# Patient Record
Sex: FEMALE | Race: WHITE | Employment: FULL TIME | URBAN - METROPOLITAN AREA
[De-identification: names, ages, dates, MRNs, and addresses within clinical notes are randomized per-mention and may not be internally consistent; named-entity substitution may affect disease eponyms.]

---

## 2017-03-25 ENCOUNTER — HOSPITAL ENCOUNTER (OUTPATIENT)
Dept: ULTRASOUND IMAGING | Age: 42
Discharge: HOME | End: 2017-03-25
Payer: MEDICAID

## 2017-03-25 DIAGNOSIS — Z34.91 ENCOUNTER FOR SUPERVISION OF NORMAL PREGNANCY IN FIRST TRIMESTER, UNSPECIFIED GRAVIDITY: ICD-10-CM

## 2017-03-25 DIAGNOSIS — Z34.80 SUPERVISION OF OTHER NORMAL PREGNANCY, ANTEPARTUM: ICD-10-CM

## 2017-03-25 PROCEDURE — 76805 OB US >/= 14 WKS SNGL FETUS: CPT

## 2023-05-30 DIAGNOSIS — G47.00 INSOMNIA, UNSPECIFIED TYPE: ICD-10-CM

## 2023-05-30 RX ORDER — ZOLPIDEM TARTRATE 5 MG/1
1 TABLET ORAL NIGHTLY PRN
COMMUNITY
Start: 2020-09-14 | End: 2023-05-30 | Stop reason: SDUPTHER

## 2023-05-31 RX ORDER — ZOLPIDEM TARTRATE 5 MG/1
5 TABLET ORAL NIGHTLY PRN
Qty: 30 TABLET | Refills: 0 | Status: SHIPPED | OUTPATIENT
Start: 2023-05-31 | End: 2023-06-12 | Stop reason: SDUPTHER

## 2023-06-12 ENCOUNTER — OFFICE VISIT (OUTPATIENT)
Dept: PRIMARY CARE | Facility: CLINIC | Age: 48
End: 2023-06-12
Payer: COMMERCIAL

## 2023-06-12 VITALS
HEART RATE: 88 BPM | HEIGHT: 63 IN | BODY MASS INDEX: 41.82 KG/M2 | RESPIRATION RATE: 16 BRPM | DIASTOLIC BLOOD PRESSURE: 82 MMHG | TEMPERATURE: 98.1 F | WEIGHT: 236 LBS | SYSTOLIC BLOOD PRESSURE: 130 MMHG

## 2023-06-12 DIAGNOSIS — Z12.12 ENCOUNTER FOR COLORECTAL CANCER SCREENING: ICD-10-CM

## 2023-06-12 DIAGNOSIS — F51.01 PRIMARY INSOMNIA: Primary | ICD-10-CM

## 2023-06-12 DIAGNOSIS — Z12.11 ENCOUNTER FOR COLORECTAL CANCER SCREENING: ICD-10-CM

## 2023-06-12 DIAGNOSIS — G89.29 CHRONIC PAIN OF RIGHT KNEE: ICD-10-CM

## 2023-06-12 DIAGNOSIS — Z12.31 ENCOUNTER FOR SCREENING MAMMOGRAM FOR MALIGNANT NEOPLASM OF BREAST: ICD-10-CM

## 2023-06-12 DIAGNOSIS — M25.561 CHRONIC PAIN OF RIGHT KNEE: ICD-10-CM

## 2023-06-12 DIAGNOSIS — Z00.00 ROUTINE GENERAL MEDICAL EXAMINATION AT A HEALTH CARE FACILITY: ICD-10-CM

## 2023-06-12 DIAGNOSIS — G47.00 INSOMNIA, UNSPECIFIED TYPE: ICD-10-CM

## 2023-06-12 PROBLEM — M17.11 PRIMARY OSTEOARTHRITIS OF RIGHT KNEE: Status: RESOLVED | Noted: 2023-06-12 | Resolved: 2023-06-12

## 2023-06-12 PROBLEM — M17.11 PRIMARY OSTEOARTHRITIS OF RIGHT KNEE: Status: ACTIVE | Noted: 2023-06-12

## 2023-06-12 PROCEDURE — 1036F TOBACCO NON-USER: CPT | Performed by: FAMILY MEDICINE

## 2023-06-12 PROCEDURE — 99214 OFFICE O/P EST MOD 30 MIN: CPT | Performed by: FAMILY MEDICINE

## 2023-06-12 RX ORDER — MELOXICAM 15 MG/1
15 TABLET ORAL DAILY
Qty: 30 TABLET | Refills: 11 | Status: SHIPPED | OUTPATIENT
Start: 2023-06-12 | End: 2024-06-11

## 2023-06-12 RX ORDER — IBUPROFEN 200 MG
1 TABLET ORAL 3 TIMES DAILY PRN
COMMUNITY
Start: 2020-09-14

## 2023-06-12 RX ORDER — ZOLPIDEM TARTRATE 5 MG/1
5 TABLET ORAL NIGHTLY PRN
Qty: 30 TABLET | Refills: 0 | Status: SHIPPED | OUTPATIENT
Start: 2023-06-12 | End: 2023-07-25 | Stop reason: SDUPTHER

## 2023-06-12 ASSESSMENT — ENCOUNTER SYMPTOMS: INSOMNIA: 1

## 2023-06-12 NOTE — PROGRESS NOTES
Subjective   Harpal Mandujano is a 48 y.o. female who presents for Insomnia.  Insomnia  This is a chronic problem. The problem occurs intermittently. Progression since onset: stable. Treatments tried: Ambien. The treatment provided significant relief.     Visit Vitals  /82   Pulse 88   Temp 36.7 °C (98.1 °F)   Resp 16      Objective   Physical Exam  Constitutional:       Appearance: Normal appearance.   HENT:      Head: Normocephalic.   Eyes:      Conjunctiva/sclera: Conjunctivae normal.   Cardiovascular:      Rate and Rhythm: Normal rate and regular rhythm.   Pulmonary:      Effort: Pulmonary effort is normal.      Breath sounds: Normal breath sounds.   Musculoskeletal:      Cervical back: Neck supple.      Comments: Right knee: There is no effusion, the patella is mobile.  MCL and LCL are intact.  Anterior and posterior cruciate ligaments are intact.  There is significant pain and crepitus on Magdalena's test both medially and laterally.  There is significant crepitus on knee flexion and pain.   Skin:     General: Skin is warm and dry.   Neurological:      Mental Status: She is alert.         Assessment/Plan    Problem List Items Addressed This Visit       Primary insomnia - Primary     She works nights and swings her sleep schedule back-and-forth twice a week.  Ambien helps her to sleep when she needs to.  We signed a controlled substance agreement and reviewed her OARRS report today.  I will continue to refill her Ambien         Relevant Medications    zolpidem (Ambien) 5 mg tablet    Chronic pain of right knee     Examination today is consistent with meniscus injury but there also may be a component of osteoarthritis.  We will start her on meloxicam 50 mg daily as this is causing significant discomfort at work where she is required to be on her feet during the day.  I will also refer her to orthopedic surgery as she may be a good candidate for an injection or arthroscopic procedure.         Relevant  Medications    meloxicam (Mobic) 15 mg tablet    Other Relevant Orders    Referral to Orthopaedic Surgery     Other Visit Diagnoses       Encounter for screening mammogram for malignant neoplasm of breast        Relevant Orders    BI mammo bilateral screening tomosynthesis    Encounter for colorectal cancer screening        Relevant Orders    Colonoscopy    Insomnia, unspecified type        Relevant Medications    zolpidem (Ambien) 5 mg tablet    Routine general medical examination at a health care facility        Relevant Orders    CBC    Comprehensive Metabolic Panel    Lipid Panel    Referral to Obstetrics / Gynecology    Hepatitis C Antibody        It has been 2 years since she has been in for a visit so we will go ahead and catch her up on her screening blood work, mammogram, colonoscopy.  I will also refer her for screening Pap test as it has been about 5 years

## 2023-06-12 NOTE — ASSESSMENT & PLAN NOTE
Examination today is consistent with meniscus injury but there also may be a component of osteoarthritis.  We will start her on meloxicam 50 mg daily as this is causing significant discomfort at work where she is required to be on her feet during the day.  I will also refer her to orthopedic surgery as she may be a good candidate for an injection or arthroscopic procedure.

## 2023-06-12 NOTE — ASSESSMENT & PLAN NOTE
She works nights and swings her sleep schedule back-and-forth twice a week.  Ambien helps her to sleep when she needs to.  We signed a controlled substance agreement and reviewed her OARRS report today.  I will continue to refill her Ambien

## 2023-07-25 DIAGNOSIS — G47.00 INSOMNIA, UNSPECIFIED TYPE: ICD-10-CM

## 2023-07-25 RX ORDER — ZOLPIDEM TARTRATE 5 MG/1
5 TABLET ORAL NIGHTLY PRN
Qty: 90 TABLET | Refills: 0 | Status: SHIPPED | OUTPATIENT
Start: 2023-07-25 | End: 2023-10-26 | Stop reason: SDUPTHER

## 2023-10-26 DIAGNOSIS — G47.00 INSOMNIA, UNSPECIFIED TYPE: ICD-10-CM

## 2023-10-26 RX ORDER — ZOLPIDEM TARTRATE 5 MG/1
5 TABLET ORAL NIGHTLY PRN
Qty: 90 TABLET | Refills: 0 | Status: SHIPPED | OUTPATIENT
Start: 2023-10-26 | End: 2024-01-18 | Stop reason: SDUPTHER

## 2024-01-18 DIAGNOSIS — G47.00 INSOMNIA, UNSPECIFIED TYPE: ICD-10-CM

## 2024-01-18 RX ORDER — ZOLPIDEM TARTRATE 5 MG/1
5 TABLET ORAL NIGHTLY PRN
Qty: 90 TABLET | Refills: 0 | Status: SHIPPED | OUTPATIENT
Start: 2024-01-18 | End: 2024-04-16 | Stop reason: SDUPTHER

## 2024-04-16 DIAGNOSIS — G47.00 INSOMNIA, UNSPECIFIED TYPE: ICD-10-CM

## 2024-04-16 RX ORDER — ZOLPIDEM TARTRATE 5 MG/1
5 TABLET ORAL NIGHTLY PRN
Qty: 90 TABLET | Refills: 0 | Status: SHIPPED | OUTPATIENT
Start: 2024-04-16

## 2024-07-05 DIAGNOSIS — G47.00 INSOMNIA, UNSPECIFIED TYPE: ICD-10-CM

## 2024-07-05 RX ORDER — ZOLPIDEM TARTRATE 5 MG/1
5 TABLET ORAL NIGHTLY PRN
Qty: 90 TABLET | Refills: 0 | OUTPATIENT
Start: 2024-07-05

## 2024-07-05 NOTE — TELEPHONE ENCOUNTER
Harpal  needs an appointment before any meds an be refilled. If she  needs a short supply while waiting to get in, let me know and we can do that.

## 2024-07-08 NOTE — TELEPHONE ENCOUNTER
Patient is scheduled for August 5th, 2024 @ 10:20 per her request.   She has many appointments due to taking care of her elderly father right now.   Would you be able to send in a month supply until the patient comes in 8-5-24, or do you need to see her sooner?     Please advise, Thanks!

## 2024-08-05 ENCOUNTER — APPOINTMENT (OUTPATIENT)
Dept: PRIMARY CARE | Facility: CLINIC | Age: 49
End: 2024-08-05
Payer: COMMERCIAL

## 2024-08-05 VITALS
HEART RATE: 98 BPM | DIASTOLIC BLOOD PRESSURE: 82 MMHG | HEIGHT: 63 IN | SYSTOLIC BLOOD PRESSURE: 136 MMHG | RESPIRATION RATE: 18 BRPM | TEMPERATURE: 97.8 F | WEIGHT: 234 LBS | BODY MASS INDEX: 41.46 KG/M2

## 2024-08-05 DIAGNOSIS — Z11.59 ENCOUNTER FOR HEPATITIS C SCREENING TEST FOR LOW RISK PATIENT: ICD-10-CM

## 2024-08-05 DIAGNOSIS — Z00.00 ROUTINE GENERAL MEDICAL EXAMINATION AT A HEALTH CARE FACILITY: Primary | ICD-10-CM

## 2024-08-05 DIAGNOSIS — Z12.11 ENCOUNTER FOR SCREENING FOR MALIGNANT NEOPLASM OF COLON: ICD-10-CM

## 2024-08-05 DIAGNOSIS — Z12.31 ENCOUNTER FOR SCREENING MAMMOGRAM FOR MALIGNANT NEOPLASM OF BREAST: ICD-10-CM

## 2024-08-05 DIAGNOSIS — G47.00 INSOMNIA, UNSPECIFIED TYPE: ICD-10-CM

## 2024-08-05 DIAGNOSIS — F51.01 PRIMARY INSOMNIA: ICD-10-CM

## 2024-08-05 PROCEDURE — 1036F TOBACCO NON-USER: CPT | Performed by: FAMILY MEDICINE

## 2024-08-05 PROCEDURE — 99396 PREV VISIT EST AGE 40-64: CPT | Performed by: FAMILY MEDICINE

## 2024-08-05 PROCEDURE — 3008F BODY MASS INDEX DOCD: CPT | Performed by: FAMILY MEDICINE

## 2024-08-05 RX ORDER — ZOLPIDEM TARTRATE 5 MG/1
5 TABLET ORAL NIGHTLY PRN
Qty: 30 TABLET | Refills: 5 | Status: SHIPPED | OUTPATIENT
Start: 2024-08-05

## 2024-08-05 ASSESSMENT — ENCOUNTER SYMPTOMS: INSOMNIA: 1

## 2024-08-05 NOTE — PROGRESS NOTES
Subjective   Harpal Mandujano is a 49 y.o. female who presents for Insomnia.     Insomnia  This is a chronic problem. The problem has been unchanged. Treatments tried: Ambien. The treatment provided significant relief.            Visit Vitals  /82   Pulse 98   Temp 36.6 °C (97.8 °F)   Resp 18      Objective   Physical Exam  Constitutional:       Appearance: Normal appearance.   HENT:      Head: Normocephalic.   Eyes:      Conjunctiva/sclera: Conjunctivae normal.   Cardiovascular:      Rate and Rhythm: Normal rate and regular rhythm.      Heart sounds: Normal heart sounds.   Pulmonary:      Effort: Pulmonary effort is normal.      Breath sounds: Normal breath sounds.   Musculoskeletal:      Cervical back: Neck supple.   Skin:     General: Skin is warm and dry.   Neurological:      Mental Status: She is alert.            Assessment & Plan  Routine general medical examination at a health care facility    Orders:    CBC; Future    Comprehensive Metabolic Panel; Future    Lipid Panel; Future    Hepatitis C Antibody; Future    BI mammo bilateral screening tomosynthesis; Future    Cologuard® colon cancer screening; Future    Cologuard® colon cancer screening    Follow Up In [facility] - PCP - Health Maintenance; Future    Primary insomnia  She continues to do well with routine dosing of zolpidem 5 mg.  She denies any issues with sleepwalking or daytime somnolence.  A PDMP was reviewed and is consistent so we will continue her current prescription refills       Encounter for hepatitis C screening test for low risk patient    Orders:    Hepatitis C Antibody; Future    Encounter for screening for malignant neoplasm of colon    Orders:    Cologuard® colon cancer screening; Future    Cologuard® colon cancer screening    Encounter for screening mammogram for malignant neoplasm of breast    Orders:    BI mammo bilateral screening tomosynthesis; Future    Insomnia, unspecified type    Orders:    zolpidem (Ambien) 5  mg tablet; Take 1 tablet (5 mg) by mouth as needed at bedtime for sleep.           Please excuse any errors in grammar or translation related to this dictation. Voice recognition software was utilized to prepare this document.

## 2024-08-05 NOTE — ASSESSMENT & PLAN NOTE
She continues to do well with routine dosing of zolpidem 5 mg.  She denies any issues with sleepwalking or daytime somnolence.  A PDMP was reviewed and is consistent so we will continue her current prescription refills

## 2025-02-05 DIAGNOSIS — G47.00 INSOMNIA, UNSPECIFIED TYPE: ICD-10-CM

## 2025-02-05 RX ORDER — ZOLPIDEM TARTRATE 5 MG/1
5 TABLET ORAL NIGHTLY PRN
Qty: 30 TABLET | Refills: 5 | Status: SHIPPED | OUTPATIENT
Start: 2025-02-05

## 2025-07-25 DIAGNOSIS — G47.00 INSOMNIA, UNSPECIFIED TYPE: ICD-10-CM

## 2025-07-25 RX ORDER — ZOLPIDEM TARTRATE 5 MG/1
5 TABLET ORAL NIGHTLY PRN
Qty: 30 TABLET | Refills: 5 | Status: SHIPPED | OUTPATIENT
Start: 2025-07-25

## 2025-08-07 ENCOUNTER — APPOINTMENT (OUTPATIENT)
Dept: PRIMARY CARE | Facility: CLINIC | Age: 50
End: 2025-08-07
Payer: COMMERCIAL

## 2025-08-07 ENCOUNTER — TELEPHONE (OUTPATIENT)
Dept: PRIMARY CARE | Facility: CLINIC | Age: 50
End: 2025-08-07

## 2025-08-07 VITALS
HEIGHT: 63 IN | WEIGHT: 236 LBS | SYSTOLIC BLOOD PRESSURE: 130 MMHG | HEART RATE: 88 BPM | BODY MASS INDEX: 41.82 KG/M2 | DIASTOLIC BLOOD PRESSURE: 88 MMHG | OXYGEN SATURATION: 97 % | TEMPERATURE: 98.2 F | RESPIRATION RATE: 16 BRPM

## 2025-08-07 DIAGNOSIS — Z00.00 WELL ADULT EXAM: ICD-10-CM

## 2025-08-07 DIAGNOSIS — Z00.00 ROUTINE GENERAL MEDICAL EXAMINATION AT A HEALTH CARE FACILITY: ICD-10-CM

## 2025-08-07 DIAGNOSIS — Z00.00 WELL ADULT EXAM: Primary | ICD-10-CM

## 2025-08-07 DIAGNOSIS — Z12.31 ENCOUNTER FOR SCREENING MAMMOGRAM FOR MALIGNANT NEOPLASM OF BREAST: ICD-10-CM

## 2025-08-07 DIAGNOSIS — F51.01 PRIMARY INSOMNIA: ICD-10-CM

## 2025-08-07 DIAGNOSIS — Z13.6 SCREENING FOR HEART DISEASE: ICD-10-CM

## 2025-08-07 DIAGNOSIS — Z12.11 ENCOUNTER FOR SCREENING FOR MALIGNANT NEOPLASM OF COLON: ICD-10-CM

## 2025-08-07 DIAGNOSIS — E66.813 CLASS 3 SEVERE OBESITY DUE TO EXCESS CALORIES WITHOUT SERIOUS COMORBIDITY WITH BODY MASS INDEX (BMI) OF 40.0 TO 44.9 IN ADULT: Primary | ICD-10-CM

## 2025-08-07 DIAGNOSIS — Z11.59 ENCOUNTER FOR HEPATITIS C SCREENING TEST FOR LOW RISK PATIENT: ICD-10-CM

## 2025-08-07 PROCEDURE — 99396 PREV VISIT EST AGE 40-64: CPT | Performed by: FAMILY MEDICINE

## 2025-08-07 PROCEDURE — 3008F BODY MASS INDEX DOCD: CPT | Performed by: FAMILY MEDICINE

## 2025-08-07 NOTE — ASSESSMENT & PLAN NOTE
Fortunately she is maintaining her weight here over a year but it would be beneficial to follow a healthy diet and exercise program to bring the weight down and reduce future risk  Orders:    Comprehensive Metabolic Panel; Future    Hemoglobin A1C; Future    CBC; Future

## 2025-08-07 NOTE — PROGRESS NOTES
Subjective   Harpal Mandujano is a 50 y.o. female who presents for Annual Exam.     HPI         Review of Systems   There were no vitals taken for this visit.   Objective   Physical Exam  Constitutional:       Appearance: Normal appearance.   HENT:      Head: Normocephalic.      Right Ear: Tympanic membrane, ear canal and external ear normal.      Left Ear: Tympanic membrane, ear canal and external ear normal.      Nose: Nose normal.      Mouth/Throat:      Mouth: Mucous membranes are moist.      Pharynx: Oropharynx is clear.     Eyes:      Conjunctiva/sclera: Conjunctivae normal.       Cardiovascular:      Rate and Rhythm: Normal rate and regular rhythm.   Pulmonary:      Effort: Pulmonary effort is normal.      Breath sounds: Normal breath sounds.     Musculoskeletal:         General: Normal range of motion.      Cervical back: Neck supple.     Skin:     General: Skin is warm and dry.     Neurological:      General: No focal deficit present.      Mental Status: She is alert and oriented to person, place, and time.     Psychiatric:         Mood and Affect: Mood normal.       No data recorded     No data recorded   No data recorded   Assessment & Plan  Routine general medical examination at a health care facility  Well adult exam  Unfortunately she was not able to get her screening blood work done last year.  We discussed the importance of screening and early detection.  We will add an A1c due to a family history of diabetes.  She is a bit overdue for mammography and colorectal screening which will be ordered today.  I also encouraged her to schedule Pap screening either here at this office or with her gynecologist.  It has been 7 years since her last Pap test.    Orders:    Follow Up [facility] - PCP - Health Maintenance    Follow Up In [facility] - PCP; Future    Class 3 severe obesity due to excess calories without serious comorbidity with body mass index (BMI) of 40.0 to 44.9 in  adult  Fortunately she is maintaining her weight here over a year but it would be beneficial to follow a healthy diet and exercise program to bring the weight down and reduce future risk  Orders:    Comprehensive Metabolic Panel; Future    Hemoglobin A1C; Future    CBC; Future    Screening for heart disease    Orders:    Lipid Panel; Future    Encounter for screening for malignant neoplasm of colon    Orders:    Colonoscopy Screening; Average Risk Patient; Future    Encounter for screening mammogram for malignant neoplasm of breast    Orders:    BI mammo bilateral screening tomosynthesis; Future    Encounter for hepatitis C screening test for low risk patient    Orders:    Hepatitis C Antibody; Future    Primary insomnia  She works the night shift and uses Ambien to help sleep during the day which is very effective.  She rarely skips a dose but does not feel like she has any somnolence during her workday.  I did encourage her to try skipping a dose periodically to let it bleed out of her system.  A PDMP was reviewed and a controlled substance agreement was reviewed as well.  All is consistent so we will continue 5 mg Ambien most days at bedtime              Please excuse any errors in grammar or translation related to this dictation. Voice recognition software was utilized to prepare this document.

## 2025-08-07 NOTE — ASSESSMENT & PLAN NOTE
She works the night shift and uses Ambien to help sleep during the day which is very effective.  She rarely skips a dose but does not feel like she has any somnolence during her workday.  I did encourage her to try skipping a dose periodically to let it bleed out of her system.  A PDMP was reviewed and a controlled substance agreement was reviewed as well.  All is consistent so we will continue 5 mg Ambien most days at bedtime

## 2025-08-18 ENCOUNTER — TELEPHONE (OUTPATIENT)
Dept: GASTROENTEROLOGY | Facility: EXTERNAL LOCATION | Age: 50
End: 2025-08-18
Payer: COMMERCIAL

## 2026-08-12 ENCOUNTER — APPOINTMENT (OUTPATIENT)
Dept: PRIMARY CARE | Facility: CLINIC | Age: 51
End: 2026-08-12
Payer: COMMERCIAL